# Patient Record
Sex: FEMALE | Race: WHITE | NOT HISPANIC OR LATINO | URBAN - METROPOLITAN AREA
[De-identification: names, ages, dates, MRNs, and addresses within clinical notes are randomized per-mention and may not be internally consistent; named-entity substitution may affect disease eponyms.]

---

## 2020-02-11 ENCOUNTER — OFFICE VISIT - RIVER FALLS (OUTPATIENT)
Dept: FAMILY MEDICINE | Facility: CLINIC | Age: 20
End: 2020-02-11

## 2020-02-11 ASSESSMENT — MIFFLIN-ST. JEOR: SCORE: 1637.23

## 2022-02-12 VITALS
OXYGEN SATURATION: 97 % | BODY MASS INDEX: 29.22 KG/M2 | DIASTOLIC BLOOD PRESSURE: 80 MMHG | WEIGHT: 186.2 LBS | SYSTOLIC BLOOD PRESSURE: 110 MMHG | HEART RATE: 66 BPM | HEIGHT: 67 IN

## 2022-02-16 NOTE — PROGRESS NOTES
Patient:   YIFAN SHIRLEY            MRN: [id number]            FIN: [id number]               Age:   19 years     Sex:  Female     :  2000   Associated Diagnoses:   Pre-employment examination   Author:   Kade Gomez PA-C      Report Summary   Diagnosis  Pre-employment examination ([id number] Z02.1).  Patient Instructions   Visit Information      Date of Service: 2020 02:00 pm  Performing Location: [facility]  Encounter#: [id number]      Primary Care Provider (PCP):  NONE ,       Referring Provider:  Kade Gomez PA-C    NPI# [id number]   Visit type:  Pre-employment exam   .    Accompanied by:  No one.    Source of history:  Self.    Referral source:  Self.    History limitation:  None.       Chief Complaint   2020 1:56 PM CST    Preemployment exam, back screen and non-dot drug screen- [facility]        Well Adult History   Well Adult History             The patient presents for well adult exam.  The patient's general health status is described as good.  The patient's diet is described as balanced.  Exercise: routine.  Associated symptoms consist of none.  Last menstrual period: regular.  Additional pertinent history: daily caffeine use, tobacco use none and alcohol use socially.        Review of Systems   Constitutional:  Negative.    Eye:  Negative.    Respiratory:  Negative.    Cardiovascular:  Negative.    Breast:  Negative.    Gastrointestinal:  Negative.    Genitourinary:  Negative.    Gynecologic:  Negative.    Hematology/Lymphatics:  Negative.    Endocrine:  Negative.    Immunologic:  Negative.    Musculoskeletal:  Negative.    Integumentary:  Negative.    Neurologic:  Negative.    Psychiatric:  Negative.       Health Status   Allergies:    Allergic Reactions (All)  No Known Medication Allergies   Medications:  (Selected)   Documented Medications  Documented  OCP: OCP, 0 Refill(s), Type: Maintenance,    Medications          *denotes recorded medication          *OCP:  0 Refill(s).       Problem list:    No problem items selected or recorded.      Histories   Past Medical History:    No active or resolved past medical history items have been selected or recorded.   Family History:    No family history items have been selected or recorded.   Procedure history:    No active procedure history items have been selected or recorded.   Social History:             No active social history items have been recorded.      Physical Examination   Vital Signs   2/11/2020 1:56 PM CST Peripheral Pulse Rate 66 bpm    HR Method Electronic    Systolic Blood Pressure 110 mmHg    Diastolic Blood Pressure 80 mmHg    Mean Arterial Pressure 90 mmHg    BP Site Right arm    BP Method Manual    Oxygen Saturation 97 %      Measurements from flowsheet : Measurements   2/11/2020 1:56 PM CST Height Measured - Standard 67 in    Weight Measured - Standard 186.2 lb    BSA 2 m2    Body Mass Index 29.16 kg/m2  HI      General:  Alert and oriented, No acute distress.    Eye:  Pupils are equal, round and reactive to light, Extraocular movements are intact, Normal conjunctiva.    HENT:  Normocephalic, Tympanic membranes are clear, Normal hearing, Oral mucosa is moist, No pharyngeal erythema.    Neck:  Supple, Non-tender, No lymphadenopathy, No thyromegaly.    Respiratory:  Lungs are clear to auscultation, Respirations are non-labored, Breath sounds are equal.    Cardiovascular:  Normal rate, Regular rhythm, No murmur.    Gastrointestinal:  Soft, Non-tender, Non-distended, No organomegaly.    Genitourinary:  No costovertebral angle tenderness.    Musculoskeletal:  Normal range of motion, Normal strength, No tenderness, No swelling, Scored an eight on the low back screen..    Integumentary:  Warm, Pink, No rash.    Neurologic:  Alert, Oriented, Normal sensory, Normal motor function, No focal deficits.    Psychiatric:  Cooperative, Appropriate mood & affect.       Impression and Plan   Diagnosis     Pre-employment  examination ([id number] Z02.1).     Patient Instructions:       Counseled: Patient, Verbalized understanding.    See form in chart.  Cleared for employment without restriction.

## 2022-02-16 NOTE — NURSING NOTE
Comprehensive Intake Entered On:  2/11/2020 2:02 PM CST    Performed On:  2/11/2020 1:56 PM CST by Amisha Colindres CMA               Summary   Chief Complaint :   Preemployment exam, back screen and non-dot drug screen- [facility]    Weight Measured :   186.2 lb(Converted to: 186 lb 3 oz, 84.46 kg)    Height Measured :   67 in(Converted to: 5 ft 7 in, 170.18 cm)    Body Mass Index :   29.16 kg/m2 (HI)    Body Surface Area :   2 m2   Systolic Blood Pressure :   110 mmHg   Diastolic Blood Pressure :   80 mmHg   Mean Arterial Pressure :   90 mmHg   Peripheral Pulse Rate :   66 bpm   BP Site :   Right arm   BP Method :   Manual   HR Method :   Electronic   Oxygen Saturation :   97 %   Amisha Colindres CMA - 2/11/2020 1:56 PM CST   Health Status   Allergies Verified? :   Yes   Medication History Verified? :   Yes   Medical History Verified? :   Yes   Tobacco Use? :   Never smoker   Amisha Colindres CMA - 2/11/2020 1:56 PM CST   Consents   Consent for Immunization Exchange :   Consent Granted   Consent for Immunizations to Providers :   Consent Granted   Amisha Colindres CMA - 2/11/2020 1:56 PM CST   Meds / Allergies   (As Of: 2/11/2020 2:02:25 PM CST)   Allergies (Active)   No Known Medication Allergies  Estimated Onset Date:   Unspecified ; Created By:   Amisha Colindres CMA; Reaction Status:   Active ; Category:   Drug ; Substance:   No Known Medication Allergies ; Type:   Allergy ; Updated By:   Amisha Colindres CMA; Reviewed Date:   2/11/2020 2:00 PM CST        Medication List   (As Of: 2/11/2020 2:02:25 PM CST)   Home Meds    Miscellaneous Prescription  :   Miscellaneous Prescription ; Status:   Documented ; Ordered As Mnemonic:   OCP ; Simple Display Line:   0 Refill(s) ; Catalog Code:   Miscellaneous Prescription ; Order Dt/Tm:   2/11/2020 2:00:55 PM CST            Vision Testing POC   Corrective Lenses :   None   Eye, Left Visual Acuity :   20/15   Eye, Right Visual Acuity :   20/15   Eye, Bilateral Visual Acuity :    20/15   Amisha Colindres CMA - 2/11/2020 1:56 PM CST